# Patient Record
Sex: FEMALE | Race: WHITE
[De-identification: names, ages, dates, MRNs, and addresses within clinical notes are randomized per-mention and may not be internally consistent; named-entity substitution may affect disease eponyms.]

---

## 2020-11-04 ENCOUNTER — HOSPITAL ENCOUNTER (EMERGENCY)
Dept: HOSPITAL 41 - JD.ED | Age: 30
Discharge: HOME | End: 2020-11-04
Payer: COMMERCIAL

## 2020-11-04 DIAGNOSIS — E11.9: ICD-10-CM

## 2020-11-04 DIAGNOSIS — U07.1: Primary | ICD-10-CM

## 2020-11-04 PROCEDURE — 85025 COMPLETE CBC W/AUTO DIFF WBC: CPT

## 2020-11-04 PROCEDURE — 83605 ASSAY OF LACTIC ACID: CPT

## 2020-11-04 PROCEDURE — 99285 EMERGENCY DEPT VISIT HI MDM: CPT

## 2020-11-04 PROCEDURE — 93005 ELECTROCARDIOGRAM TRACING: CPT

## 2020-11-04 PROCEDURE — 83615 LACTATE (LD) (LDH) ENZYME: CPT

## 2020-11-04 PROCEDURE — 36415 COLL VENOUS BLD VENIPUNCTURE: CPT

## 2020-11-04 PROCEDURE — 82728 ASSAY OF FERRITIN: CPT

## 2020-11-04 PROCEDURE — 84484 ASSAY OF TROPONIN QUANT: CPT

## 2020-11-04 PROCEDURE — 80053 COMPREHEN METABOLIC PANEL: CPT

## 2020-11-04 PROCEDURE — 86140 C-REACTIVE PROTEIN: CPT

## 2020-11-04 PROCEDURE — U0002 COVID-19 LAB TEST NON-CDC: HCPCS

## 2020-11-04 PROCEDURE — 87635 SARS-COV-2 COVID-19 AMP PRB: CPT

## 2020-11-04 PROCEDURE — 71045 X-RAY EXAM CHEST 1 VIEW: CPT

## 2020-11-04 PROCEDURE — 85379 FIBRIN DEGRADATION QUANT: CPT

## 2020-11-04 NOTE — EDM.PDOC
ED HPI GENERAL MEDICAL PROBLEM





- General


Chief Complaint: Respiratory Problem


Stated Complaint: SOB/COUGH


Time Seen by Provider: 11/04/20 16:24


Source of Information: Reports: Patient, RN Notes Reviewed


History Limitations: Reports: No Limitations





- History of Present Illness


INITIAL COMMENTS - FREE TEXT/NARRATIVE: 





Patient is a 30-year-old female presenting to the emergency department with 

complaints of cough, shortness of breath, body aches, and fever.  States her 

symptoms began on Thursday and have been progressively worsening.  She has had a

known exposure to COVID-19 as her boyfriend who lives with her also has it.  She

contacted her primary care provider in [place] to discuss her symptoms on 

Monday.  She was started on albuterol and Advair inhalers, although she does not

have a history of asthma.  She does have a history of Hodgkin's lymphoma 

requiring chemo and radiation back in 2007.  She is currently in remission from 

this.  She states she did have several rounds of radiation to her mid sternum 

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 

the radiation.  She states whenever she gets sick she generally gets short of 

breath.  She denies any abdominal pain, nausea, or vomiting.  She last used her 

albuterol this morning.  She states it does seem to improve her shortness of 

breath.  Last dose of Tylenol was around 10:00 this morning.





- Related Data


                                    Allergies











Allergy/AdvReac Type Severity Reaction Status Date / Time


 


No Known Allergies Allergy   Verified 11/04/20 15:28











Home Meds: 


                                    Home Meds





Albuterol Sulfate [Albuterol Sulfate Hfa]  11/04/20 [History]


Fluticasone Propion/Salmeterol [Advair Hfa 45-21 Mcg Inhaler]  11/04/20 

[History]


dexAMETHasone [Dexamethasone] 6 mg PO BID 5 Days #10 tab 11/04/20 [Rx]











Past Medical History


Endocrine/Metabolic History: Reports: Diabetes, Type II


Oncologic (Cancer) History: Reports: Hodgkin's Lymphoma





- Infectious Disease History


Infectious Disease History: Reports: Novel Coronavirus





Social & Family History





- Tobacco Use


Tobacco Use Status *Q: Never Tobacco User





ED ROS GENERAL





- Review of Systems


Review Of Systems: See Below


Constitutional: Reports: Fever, Chills, Fatigue


HEENT: Reports: No Symptoms


Respiratory: Reports: Shortness of Breath, Pleuritic Chest Pain, Cough


Cardiovascular: Reports: Dyspnea on Exertion.  Denies: Lightheadedness, Syncope


Endocrine: Reports: No Symptoms


GI/Abdominal: Reports: No Symptoms


: Reports: No Symptoms


Musculoskeletal: Reports: Other (Generalized body aches)


Skin: Reports: No Symptoms


Neurological: Reports: No Symptoms


Psychiatric: Reports: No Symptoms


Hematologic/Lymphatic: Reports: No Symptoms


Immunologic: Reports: No Symptoms





ED EXAM, GENERAL





- Physical Exam


Exam: See Below


General Appearance: Alert, WD/WN, No Apparent Distress


Respiratory/Chest: No Respiratory Distress, Lungs Clear, Normal Breath Sounds, 

No Accessory Muscle Use, Chest Non-Tender, Other (Tachypneic with shallow 

respirations)


Cardiovascular: Normal Peripheral Pulses, Regular Rate, Rhythm, No Edema, No 

Gallop, No JVD, No Murmur, No Rub


GI/Abdominal: Normal Bowel Sounds, Soft, Non-Tender, No Organomegaly, No 

Distention, No Abnormal Bruit, No Mass


Neurological: Alert, Oriented, CN II-XII Intact, Normal Cognition, Normal Gait, 

Normal Reflexes, No Motor/Sensory Deficits


Psychiatric: Normal Affect, Normal Mood


Skin Exam: Warm, Dry, Intact, Normal Color, No Rash


  ** #1 Interpretation


EKG Date: 11/04/20


Time: 16:52


Rhythm: NSR


Rate (Beats/Min): 134


Axis: Normal


P-Wave: Present


QRS: Normal


ST-T: Normal


QT: Normal


EKG Interpretation Comments: 





 Sinus tachycardia at 134


Decreased voltage precordial leads


Occasional PACs


Consider left atrial hypertrophy


EKG interpreted by Dr. Nohemi JENNINGS





Course





- Vital Signs


Last Recorded V/S: 


                                Last Vital Signs











Temp  99.9 F   11/04/20 15:28


 


Pulse  130 H  11/04/20 15:28


 


Resp  20   11/04/20 16:48


 


BP  153/98 H  11/04/20 15:28


 


Pulse Ox  94 L  11/04/20 16:48








                                        





Orthostatic Blood Pressure [     118/90


Standing]                        


Orthostatic Blood Pressure [     115/80


Supine]                          











- Orders/Labs/Meds


Orders: 


                               Active Orders 24 hr











 Category Date Time Status


 


 Chest 1V Frontal [CR] Stat Exams  11/04/20 16:25 Taken


 


 Isolation [COMM] Routine Oth  11/04/20 16:06 Ordered


 


 Peripheral IV Insertion Adult [OM.PC] Stat Oth  11/04/20 16:25 Ordered











Labs: 


                                Laboratory Tests











  11/04/20 11/04/20 11/04/20 Range/Units





  16:45 16:50 16:50 


 


WBC     (3.98-10.04)  K/mm3


 


RBC     (3.98-5.22)  M/mm3


 


Hgb     (11.2-15.7)  gm/dl


 


Hct     (34.1-44.9)  %


 


MCV     (79.4-94.8)  fl


 


MCH     (25.6-32.2)  pg


 


MCHC     (32.2-35.5)  g/dl


 


RDW Std Deviation     (36.4-46.3)  fL


 


Plt Count     (182-369)  K/mm3


 


MPV     (9.4-12.3)  fl


 


Neut % (Auto)     (34.0-71.1)  %


 


Lymph % (Auto)     (19.3-51.7)  %


 


Mono % (Auto)     (4.7-12.5)  %


 


Eos % (Auto)     (0.7-5.8)  


 


Baso % (Auto)     (0.1-1.2)  %


 


Neut # (Auto)     (1.56-6.13)  K/mm3


 


Lymph # (Auto)     (1.18-3.74)  K/mm3


 


Mono # (Auto)     (0.24-0.36)  K/mm3


 


Eos # (Auto)     (0.04-0.36)  K/mm3


 


Baso # (Auto)     (0.01-0.08)  K/mm3


 


Manual Slide Review     


 


D-Dimer, Quantitative     (0.19-0.50)  mg/L


 


Sodium     (136-145)  mEq/L


 


Potassium     (3.5-5.1)  mEq/L


 


Chloride     ()  mEq/L


 


Carbon Dioxide     (21-32)  mEq/L


 


Anion Gap     (5-15)  


 


BUN     (7-18)  mg/dL


 


Creatinine     (0.55-1.02)  mg/dL


 


Est Cr Clr Drug Dosing     mL/min


 


Estimated GFR (MDRD)     (>60)  mL/min


 


BUN/Creatinine Ratio     (14-18)  


 


Glucose     ()  mg/dL


 


Lactic Acid     (0.4-2.0)  mmol/L


 


Calcium     (8.5-10.1)  mg/dL


 


Ferritin    92  (8-252)  ng/ml


 


Total Bilirubin     (0.2-1.0)  mg/dL


 


AST     (15-37)  U/L


 


ALT     (14-59)  U/L


 


Alkaline Phosphatase     ()  U/L


 


Lactate Dehydrogenase   228   ()  U/L


 


Troponin I     (0.00-0.056)  ng/mL


 


C-Reactive Protein     (<1.0)  mg/dL


 


Total Protein     (6.4-8.2)  g/dl


 


Albumin     (3.4-5.0)  g/dl


 


Globulin     gm/dL


 


Albumin/Globulin Ratio     (1-2)  


 


SARS-CoV-2 RNA (JANEY)  Positive H    (NEGATIVE)  














  11/04/20 11/04/20 11/04/20 Range/Units





  16:50 16:50 16:50 


 


WBC  7.61    (3.98-10.04)  K/mm3


 


RBC  5.05    (3.98-5.22)  M/mm3


 


Hgb  14.5    (11.2-15.7)  gm/dl


 


Hct  45.5 H    (34.1-44.9)  %


 


MCV  90.1    (79.4-94.8)  fl


 


MCH  28.7    (25.6-32.2)  pg


 


MCHC  31.9 L    (32.2-35.5)  g/dl


 


RDW Std Deviation  46.1    (36.4-46.3)  fL


 


Plt Count  318    (182-369)  K/mm3


 


MPV  10.1    (9.4-12.3)  fl


 


Neut % (Auto)  62.7    (34.0-71.1)  %


 


Lymph % (Auto)  24.3    (19.3-51.7)  %


 


Mono % (Auto)  12.1    (4.7-12.5)  %


 


Eos % (Auto)  0.3 L    (0.7-5.8)  


 


Baso % (Auto)  0.3    (0.1-1.2)  %


 


Neut # (Auto)  4.78    (1.56-6.13)  K/mm3


 


Lymph # (Auto)  1.85    (1.18-3.74)  K/mm3


 


Mono # (Auto)  0.92 H    (0.24-0.36)  K/mm3


 


Eos # (Auto)  0.02 L    (0.04-0.36)  K/mm3


 


Baso # (Auto)  0.02    (0.01-0.08)  K/mm3


 


Manual Slide Review  Normal smear    


 


D-Dimer, Quantitative   0.22   (0.19-0.50)  mg/L


 


Sodium    135 L  (136-145)  mEq/L


 


Potassium    3.6  (3.5-5.1)  mEq/L


 


Chloride    98  ()  mEq/L


 


Carbon Dioxide    26  (21-32)  mEq/L


 


Anion Gap    14.6  (5-15)  


 


BUN    6 L  (7-18)  mg/dL


 


Creatinine    0.8  (0.55-1.02)  mg/dL


 


Est Cr Clr Drug Dosing    99.99  mL/min


 


Estimated GFR (MDRD)    > 60  (>60)  mL/min


 


BUN/Creatinine Ratio    7.5 L  (14-18)  


 


Glucose    266 H  ()  mg/dL


 


Lactic Acid     (0.4-2.0)  mmol/L


 


Calcium    9.2  (8.5-10.1)  mg/dL


 


Ferritin     (8-252)  ng/ml


 


Total Bilirubin    0.3  (0.2-1.0)  mg/dL


 


AST    57 H  (15-37)  U/L


 


ALT    69 H  (14-59)  U/L


 


Alkaline Phosphatase    77  ()  U/L


 


Lactate Dehydrogenase     ()  U/L


 


Troponin I    < 0.017  (0.00-0.056)  ng/mL


 


C-Reactive Protein    6.5 H*  (<1.0)  mg/dL


 


Total Protein    8.1  (6.4-8.2)  g/dl


 


Albumin    3.2 L  (3.4-5.0)  g/dl


 


Globulin    4.9  gm/dL


 


Albumin/Globulin Ratio    0.7 L  (1-2)  


 


SARS-CoV-2 RNA (JANEY)     (NEGATIVE)  














  11/04/20 11/04/20 Range/Units





  16:50 20:10 


 


WBC    (3.98-10.04)  K/mm3


 


RBC    (3.98-5.22)  M/mm3


 


Hgb    (11.2-15.7)  gm/dl


 


Hct    (34.1-44.9)  %


 


MCV    (79.4-94.8)  fl


 


MCH    (25.6-32.2)  pg


 


MCHC    (32.2-35.5)  g/dl


 


RDW Std Deviation    (36.4-46.3)  fL


 


Plt Count    (182-369)  K/mm3


 


MPV    (9.4-12.3)  fl


 


Neut % (Auto)    (34.0-71.1)  %


 


Lymph % (Auto)    (19.3-51.7)  %


 


Mono % (Auto)    (4.7-12.5)  %


 


Eos % (Auto)    (0.7-5.8)  


 


Baso % (Auto)    (0.1-1.2)  %


 


Neut # (Auto)    (1.56-6.13)  K/mm3


 


Lymph # (Auto)    (1.18-3.74)  K/mm3


 


Mono # (Auto)    (0.24-0.36)  K/mm3


 


Eos # (Auto)    (0.04-0.36)  K/mm3


 


Baso # (Auto)    (0.01-0.08)  K/mm3


 


Manual Slide Review    


 


D-Dimer, Quantitative    (0.19-0.50)  mg/L


 


Sodium    (136-145)  mEq/L


 


Potassium    (3.5-5.1)  mEq/L


 


Chloride    ()  mEq/L


 


Carbon Dioxide    (21-32)  mEq/L


 


Anion Gap    (5-15)  


 


BUN    (7-18)  mg/dL


 


Creatinine    (0.55-1.02)  mg/dL


 


Est Cr Clr Drug Dosing    mL/min


 


Estimated GFR (MDRD)    (>60)  mL/min


 


BUN/Creatinine Ratio    (14-18)  


 


Glucose    ()  mg/dL


 


Lactic Acid  2.9 H*  1.7  (0.4-2.0)  mmol/L


 


Calcium    (8.5-10.1)  mg/dL


 


Ferritin    (8-252)  ng/ml


 


Total Bilirubin    (0.2-1.0)  mg/dL


 


AST    (15-37)  U/L


 


ALT    (14-59)  U/L


 


Alkaline Phosphatase    ()  U/L


 


Lactate Dehydrogenase    ()  U/L


 


Troponin I    (0.00-0.056)  ng/mL


 


C-Reactive Protein    (<1.0)  mg/dL


 


Total Protein    (6.4-8.2)  g/dl


 


Albumin    (3.4-5.0)  g/dl


 


Globulin    gm/dL


 


Albumin/Globulin Ratio    (1-2)  


 


SARS-CoV-2 RNA (JANEY)    (NEGATIVE)  











Meds: 


Medications














Discontinued Medications














Generic Name Dose Route Start Last Admin





  Trade Name Freq  PRN Reason Stop Dose Admin


 


Acetaminophen  975 mg  11/04/20 17:30  11/04/20 17:48





  Tylenol  PO  11/04/20 17:31  975 mg





  NOW ONE   Administration


 


Dexamethasone  6 mg  11/04/20 17:29  11/04/20 17:47





  Dexamethasone  PO  11/04/20 17:30  6 mg





  ONETIME ONE   Administration


 


Sodium Chloride  1,000 mls @ 999 mls/hr  11/04/20 17:56  11/04/20 18:08





  Normal Saline  IV  11/04/20 18:56  999 mls/hr





  NOW STA   Administration


 


Sodium Chloride  10 ml  11/04/20 16:24  11/04/20 17:03





  Saline Flush  FLUSH   10 ml





  ASDIRECTED PRN   Administration





  Keep Vein Open  














- Re-Assessments/Exams


Free Text/Narrative Re-Assessment/Exam: 


Patient is a 30-year-old female presenting to the emergency department with 

complaints of cough, shortness of breath, fever, body aches since Thursday of 

last week.  She is had known exposure to COVID-19.  She has been using albuterol

 and Advair inhaler since Monday.  States this does help, but shortly thereafter

 she feels short of breath again.  She does not have a home pulse oximeter so 

she is not sure what her oxygen saturations have been running.  Initial oxygen 

saturation was 88% on room air during triage, however it did improve to 91 to 

94% on room air with rest.  She is tachycardic in the 130s with a low-grade 

fever.  Patient is quite certain that she does not want to be transferred to 

another facility for admission as we do not have beds here in [place].  We 

will do complete a work-up on her and further discuss this once results are 

available.  I have ordered CBC, CMP, CRP, lactic acid, D-dimer, troponin, EKG, 

chest x-ray, and a 1 hour Covid test.  We will give her dexamethasone 6 mg p.o. 

as well as Tylenol 975 mg as it is very likely that the patient has Covid based 

on her symptoms and history of exposure.  Patient feels that she is dehydrated 

as she states she has lost 10 pounds since the onset of this illness.  We will 

wait for the labs to return and complete orthostatic vital signs higher to fluid

 administration.





11/04/20 19:39


Hematology was significant for glucose elevated at 266, lactic acid 2.9, CRP 

6.5.  D-dimer and troponin were normal.  Patient was found to be orthostatic.  

Heart rate increased from 128 - 148 from lying to standing.  She is Covid 

positive as expected.  Chest x-ray shows low lung volumes with minimal patchy 

basilar opacities which may represent atelectasis or pneumonia.  I have given 

her her liter of IV fluids as this could explain her lactic acid elevation as 

well.  She is on the edge of requiring admission as her oxygen saturations are 

in the low 90s and occasionally dipped into the upper 80s, however she continues

 to be adamant that she does not want to be admitted since we have no beds in 

[place] and she does not want to be transferred to an outlying facility.  She 

is feeling much better after the liter of IV fluids and dexamethasone pulse.  

States she does not feel nearly as short of breath.  We will plan to repeat a 

lactic acid at 3 hours.  I have sent in electronic prescription for 

dexamethasone to anali Ramírez for her mother to .  Her mother will

 also  a home pulse oximeter so that she may monitor her oxygen 

saturations at home since patient does not want to be admitted at this time.


11/04/20 21:10


Patient's repeat lactic acid was 1.7.  She continues to feel better after the 

dexamethasone.  She also used her albuterol inhaler again.  Oxygen saturations 

have maintained in the low 90s.  Her mother was able to get her a pulse oximeter

 as well as  her dexamethasone.  We will discharge her home with strict 

return precautions.  She verbalized understanding of instructions.  Discharge 

instructions as documented.





Departure





- Departure


Time of Disposition: 21:11


Disposition: Home, Self-Care 01


Condition: Good


Clinical Impression: 


 COVID-19








- Discharge Information


*PRESCRIPTION DRUG MONITORING PROGRAM REVIEWED*: No


*COPY OF PRESCRIPTION DRUG MONITORING REPORT IN PATIENT SAVI: No


Prescriptions: 


dexAMETHasone [Dexamethasone] 6 mg PO BID 5 Days #10 tab


Instructions:  COVID-19 Frequently Asked Questions, COVID-19


Referrals: 


Shirley Castellano NP [Primary Care Provider] - 


Forms:  ED Department Discharge


Additional Instructions: 


You were seen in the emergency department today for worsening of shortness of 

breath with a known exposure to COVID-19.  Your work-up included blood work, an 

EKG of your heart, chest x-ray, and a Covid test.  Results of your work-up 

showed that you were mildly dehydrated.  Chest x-ray showed some possible viral 

pneumonia versus atelectasis.  Your blood work was otherwise normal.  The EKG of

your heart was normal.  Your Covid test was positive.  While in the emergency 

department you received a first dose of dexamethasone which is a steroid, T

ylenol, as well as a liter of IV fluids.  You stated that this did improve your 

shortness of breath.  Your oxygen saturations were on the low end of normal in 

the emergency department.  A prescription for dexamethasone has been sent.  Take

this medication as prescribed.  Continue to use your albuterol inhaler as needed

for shortness of breath.  Recommend that you monitor your oxygen saturations in

termittently throughout the day.  If you find that you are maintaining an oxygen

saturation below 90% or develop any new or worsening symptoms of concern, you 

should return to the emergency department for reevaluation.





Sepsis Event Note (ED)





- Evaluation


Sepsis Screening Result: Possible Sepsis Risk





- Focused Exam


Vital Signs: 


                                   Vital Signs











  Temp Pulse Resp BP Pulse Ox


 


 11/04/20 16:48    20   94 L


 


 11/04/20 15:28  99.9 F  130 H  24 H  153/98 H  88 L














- My Orders


Last 24 Hours: 


My Active Orders





11/04/20 16:25


Chest 1V Frontal [CR] Stat 


Peripheral IV Insertion Adult [OM.PC] Stat 














- Assessment/Plan


Last 24 Hours: 


My Active Orders





11/04/20 16:25


Chest 1V Frontal [CR] Stat 


Peripheral IV Insertion Adult [OM.PC] Stat

## 2020-11-05 NOTE — CR
PROCEDURE INFORMATION:

Exam: XR Chest, 1 View

Exam date and time: 11/4/2020 4:25 PM

Age: 30 years old

Clinical indication: Cough and shortness of breath and other: Covid?

TECHNIQUE:

Imaging protocol: XR of the chest

Views: 1 view.

COMPARISON:

No relevant prior studies available.

FINDINGS:

Lungs: Lung volumes are low. Minimal patchy bilateral lower lobe opacities.

Pleural space: Unremarkable. No pleural effusion. No pneumothorax.

Heart/Mediastinum: Unremarkable. No cardiomegaly.

Bones/joints: Unremarkable.

IMPRESSION:

Low lung volumes with minimal patchy basilar opacities which may represent 
atelectasis and or

pneumonia.



Thank you for allowing us to participate in the care of your patient.

Dictated and Authenticated by: Brando Dempsey MD

11/04/2020 6:03 PM Central Time ([place] & [place])

[facility]

## 2020-11-09 ENCOUNTER — HOSPITAL ENCOUNTER (EMERGENCY)
Dept: HOSPITAL 41 - JD.ED | Age: 30
Discharge: HOME | End: 2020-11-09
Payer: COMMERCIAL

## 2020-11-09 DIAGNOSIS — E11.9: ICD-10-CM

## 2020-11-09 DIAGNOSIS — U07.1: Primary | ICD-10-CM

## 2020-11-09 DIAGNOSIS — Z79.899: ICD-10-CM

## 2020-11-09 NOTE — EDM.PDOC
ED HPI GENERAL MEDICAL PROBLEM





- General


Chief Complaint: Respiratory Problem


Stated Complaint: LOW O2


Time Seen by Provider: 11/09/20 13:20


Source of Information: Reports: Patient, Old Records, RN Notes Reviewed


History Limitations: Reports: No Limitations





- History of Present Illness


INITIAL COMMENTS - FREE TEXT/NARRATIVE: 





The patient is a 30-year-old female who presents to the ED for her ongoing 

COVID-19 symptoms.  The patient was diagnosed with COVID-19 on 11/4/2020, but 

she states that she had been symptomatic, the Friday before that which would 

have been 10/30/2020.  She notes that everything is been going well at home, she

is taking her steroids as prescribed.  But she noted today that she could not 

get her O2 saturations above 90% at home.  She noted they have been around 85 to

88%.  She states that any sort of movement really place her out, and she is not 

able to tolerate activity much at all.  She has a history of Hodgkin's lymphoma 

with radiation to her chest and spleen in 2007, and states that everything on 

that front is okay.  Her primary care provider did put her on an Advair inhaler 

and albuterol inhaler and those seem to help as well.  She states that today is 

her last day of steroids, so she became concerned when her oxygen levels were 

not much better.  She is living with her mother and father at this time, and she

states they are helping her quite a bit through this illness.  She is not 

noticed any fevers or chills, but states she is taking aspirin fairly regularly.

 She is not complaining of any nausea or vomiting, she is having a cough and 

states this is getting to be somewhat productive, and is complaining of 

worsening shortness of breath.





- Related Data


                                    Allergies











Allergy/AdvReac Type Severity Reaction Status Date / Time


 


No Known Allergies Allergy   Verified 11/09/20 13:12











Home Meds: 


                                    Home Meds





Albuterol Sulfate [Albuterol Sulfate Hfa] 2 puff INH BID 11/04/20 [History]


Fluticasone Propion/Salmeterol [Advair Hfa 45-21 Mcg Inhaler] 2 puff INH DAILY 

11/04/20 [History]


dexAMETHasone [Dexamethasone] 6 mg PO BID 5 Days #10 tab 11/09/20 [Rx]


norgestimate-ethinyl estradioL [[place] 28 Tablet] 1 tab PO ASDIRECTED 

11/09/20 [History]











Past Medical History


Endocrine/Metabolic History: Reports: Diabetes, Type II


Oncologic (Cancer) History: Reports: Hodgkin's Lymphoma


Other Oncologic History: 2007





- Infectious Disease History


Infectious Disease History: Reports: Novel Coronavirus (11/4/2020)





Social & Family History





- Tobacco Use


Tobacco Use Status *Q: Never Tobacco User





ED ROS GENERAL





- Review of Systems


Review Of Systems: Comprehensive ROS is negative, except as noted in HPI.





ED EXAM, GENERAL





- Physical Exam


Exam: See Below


Exam Limited By: No Limitations


General Appearance: Alert, WD/WN, Mild Distress (pt seems mildlyl breathless; 

has a hard time completing sentences in entirety.)


Respiratory/Chest: Lungs Clear, Respiratory Distress (mild), Decreased Breath 

Sounds (diffuse bilaterally)


Cardiovascular: Normal Peripheral Pulses, Regular Rate, Rhythm, No Murmur


Extremities: Normal Inspection, Normal Capillary Refill


Neurological: Alert, Oriented, Normal Cognition, No Motor/Sensory Deficits


Psychiatric: Normal Affect, Normal Mood


Skin Exam: Warm, Dry, Intact, Normal Color, No Rash





Course





- Vital Signs


Last Recorded V/S: 


                                Last Vital Signs











Temp  98.2 F   11/09/20 13:13


 


Pulse  84   11/09/20 15:50


 


Resp  18   11/09/20 15:50


 


BP  140/98 H  11/09/20 15:50


 


Pulse Ox  90 L  11/09/20 15:50














- Orders/Labs/Meds


Orders: 


                               Active Orders 24 hr











 Category Date Time Status


 


 Sodium Chloride 0.9% [Saline Flush] Med  11/09/20 13:41 Active





 10 ml FLUSH ASDIRECTED PRN   


 


 Isolation [COMM] Routine Oth  11/09/20 13:24 Ordered


 


 Peripheral IV Insertion Adult [OM.PC] Routine Oth  11/09/20 13:41 Ordered








                                Medication Orders





Sodium Chloride (Saline Flush)  10 ml FLUSH ASDIRECTED PRN


   PRN Reason: Keep Vein Open








Labs: 


                                Laboratory Tests











  11/09/20 11/09/20 11/09/20 Range/Units





  14:18 14:18 14:18 


 


WBC   8.44   (3.98-10.04)  K/mm3


 


RBC   4.87   (3.98-5.22)  M/mm3


 


Hgb   13.8   (11.2-15.7)  gm/dl


 


Hct   42.3   (34.1-44.9)  %


 


MCV   86.9  D   (79.4-94.8)  fl


 


MCH   28.3   (25.6-32.2)  pg


 


MCHC   32.6   (32.2-35.5)  g/dl


 


RDW Std Deviation   42.5   (36.4-46.3)  fL


 


Plt Count   539 H D   (182-369)  K/mm3


 


MPV   10.0   (9.4-12.3)  fl


 


Neutrophils % (Manual)   71 H   (40-60)  %


 


Band Neutrophils %   0   (0-10)  %


 


Lymphocytes % (Manual)   21   (20-40)  %


 


Atypical Lymphs %   0   %


 


Monocytes % (Manual)   8   (2-10)  %


 


Eosinophils % (Manual)   0 L   (0.7-5.8)  %


 


Basophils % (Manual)   0 L   (0.1-1.2)  


 


Platelet Estimate   Increased   


 


RBC Morph Comment   Normal   


 


D-Dimer, Quantitative    < 0.19 L  (0.19-0.50)  mg/L


 


Sodium     (136-145)  mEq/L


 


Potassium     (3.5-5.1)  mEq/L


 


Chloride     ()  mEq/L


 


Carbon Dioxide     (21-32)  mEq/L


 


Anion Gap     (5-15)  


 


BUN     (7-18)  mg/dL


 


Creatinine     (0.55-1.02)  mg/dL


 


Est Cr Clr Drug Dosing     mL/min


 


Estimated GFR (MDRD)     (>60)  mL/min


 


BUN/Creatinine Ratio     (14-18)  


 


Glucose     ()  mg/dL


 


Calcium     (8.5-10.1)  mg/dL


 


Magnesium     (1.8-2.4)  mg/dl


 


Total Bilirubin     (0.2-1.0)  mg/dL


 


AST     (15-37)  U/L


 


ALT     (14-59)  U/L


 


Alkaline Phosphatase     ()  U/L


 


C-Reactive Protein  1.2 H*    (<1.0)  mg/dL


 


Total Protein     (6.4-8.2)  g/dl


 


Albumin     (3.4-5.0)  g/dl


 


Globulin     gm/dL


 


Albumin/Globulin Ratio     (1-2)  














  11/09/20 Range/Units





  14:18 


 


WBC   (3.98-10.04)  K/mm3


 


RBC   (3.98-5.22)  M/mm3


 


Hgb   (11.2-15.7)  gm/dl


 


Hct   (34.1-44.9)  %


 


MCV   (79.4-94.8)  fl


 


MCH   (25.6-32.2)  pg


 


MCHC   (32.2-35.5)  g/dl


 


RDW Std Deviation   (36.4-46.3)  fL


 


Plt Count   (182-369)  K/mm3


 


MPV   (9.4-12.3)  fl


 


Neutrophils % (Manual)   (40-60)  %


 


Band Neutrophils %   (0-10)  %


 


Lymphocytes % (Manual)   (20-40)  %


 


Atypical Lymphs %   %


 


Monocytes % (Manual)   (2-10)  %


 


Eosinophils % (Manual)   (0.7-5.8)  %


 


Basophils % (Manual)   (0.1-1.2)  


 


Platelet Estimate   


 


RBC Morph Comment   


 


D-Dimer, Quantitative   (0.19-0.50)  mg/L


 


Sodium  131 L  (136-145)  mEq/L


 


Potassium  4.2  (3.5-5.1)  mEq/L


 


Chloride  95 L  ()  mEq/L


 


Carbon Dioxide  21  (21-32)  mEq/L


 


Anion Gap  19.2 H  (5-15)  


 


BUN  17  (7-18)  mg/dL


 


Creatinine  0.8  (0.55-1.02)  mg/dL


 


Est Cr Clr Drug Dosing  99.99  mL/min


 


Estimated GFR (MDRD)  > 60  (>60)  mL/min


 


BUN/Creatinine Ratio  21.3 H  (14-18)  


 


Glucose  460 H  ()  mg/dL


 


Calcium  9.1  (8.5-10.1)  mg/dL


 


Magnesium  2.1  (1.8-2.4)  mg/dl


 


Total Bilirubin  0.3  (0.2-1.0)  mg/dL


 


AST  19  (15-37)  U/L


 


ALT  34  (14-59)  U/L


 


Alkaline Phosphatase  64  ()  U/L


 


C-Reactive Protein   (<1.0)  mg/dL


 


Total Protein  7.3  (6.4-8.2)  g/dl


 


Albumin  2.9 L  (3.4-5.0)  g/dl


 


Globulin  4.4  gm/dL


 


Albumin/Globulin Ratio  0.7 L  (1-2)  











Meds: 


Medications











Generic Name Dose Route Start Last Admin





  Trade Name Freq  PRN Reason Stop Dose Admin


 


Sodium Chloride  10 ml  11/09/20 13:41 





  Saline Flush  FLUSH  





  ASDIRECTED PRN  





  Keep Vein Open  














- Re-Assessments/Exams


Free Text/Narrative Re-Assessment/Exam: 





11/09/20 13:46


Patient presents to the ED for her ongoing COVID symptoms. We will repeat chest 

x-ray and a few labs. The patient may qualify for home 02 services, as she seems

 quite capable to monitor her symptoms on her own.





11/09/20 14:18


Patient's chest x-ray has been read and demonstrates no change from 11/4/2020.  

There is some atelectasis or infiltrate in the lung bases that were appreciated.





11/09/20 15:18


Patient's laboratory evaluation has been completed, and there are no acute focal

 abnormalities at this time.  I [facility] Rad[facility] at  [facility] for possible home oxygen set up.  We did ambulate the patient in the 

room, and her O2 sats did drop to 87% on room air. 





11/09/20 15:25


Patient was placed on oxygen via nasal cannula at 2 L, and she did seem to 

recover well, O2 sats at rest are about 95-96%. Her O2 sats with ambulation on 

2LNC were 94%.








Departure





- Departure


Time of Disposition: 15:20


Disposition: Home, Self-Care 01


Condition: Good


Clinical Impression: 


 COVID-19, Hypoxia








- Discharge Information


*PRESCRIPTION DRUG MONITORING PROGRAM REVIEWED*: No


*COPY OF PRESCRIPTION DRUG MONITORING REPORT IN PATIENT SAVI: No


Prescriptions: 


dexAMETHasone [Dexamethasone] 6 mg PO BID 5 Days #10 tab


Instructions:  COVID-19 Frequently Asked Questions


Referrals: 


PCP,Not In Area [Primary Care Provider] - 


Forms:  ED Department Discharge


Additional Instructions: 


You were evaluated in the ER today for your ongoing COVID-19 symptoms.





Your lab work and chest x-ray at today's visit, are not worsening in any way, to

make us think that you need hospitalization at this time. Your blood glucose was

high at today's visit. If you do not already have a blood glucose monitor at 

home to monitor your blood sugars. As you are on steroids, your blood sugars are

likely going to be somewhat uncontrolled while taking the steroids. 





However your oxygen saturations while you are ambulating of the room did drop to

87% on room air, which will qualify you for home oxygen at this time.  An order 

has been placed to [facility] for set up.  You will need to go 

there after your discharge, to obtain the services.  There address is  [address] [place] [address] [id number], their telephone number is [phone].





As above, you have been given a prescription for oxygen at home use, I would 

recommend using 2 L via nasal cannula, to keep your O2 saturations above 90%  

You may only increase the liter flow to 4 L, if this does not keep your oxygen 

above 90%, then you should return immediately to the ER for further management.





Please continue all other medications as previously prescribed by your regular 

provider for further management of your COVID-19 symptoms.  Please go home, and 

continue to increase your oral fluid hydration, as you were slightly dehydrated 

while you were in the ER.  You did not receive any sort of IV fluids.  Liquids 

like Gatorade/Powerade would be sufficient, and please try to remember to eat 

several small meals throughout the day, to help keep her nutrition up while you 

are sick.





Please return to the ER at any time if symptoms change or worsen.











Sepsis Event Note (ED)





- Evaluation


Sepsis Screening Result: No Definite Risk





- Focused Exam


Vital Signs: 


                                   Vital Signs











  Temp Pulse Resp BP Pulse Ox


 


 11/09/20 15:50   84  18  140/98 H  90 L


 


 11/09/20 13:13  98.2 F  106 H  22 H  134/99 H  94 L














- My Orders


Last 24 Hours: 


My Active Orders





11/09/20 13:24


Isolation [COMM] Routine 





11/09/20 13:41


Sodium Chloride 0.9% [Saline Flush]   10 ml FLUSH ASDIRECTED PRN 


Peripheral IV Insertion Adult [OM.PC] Routine 














- Assessment/Plan


Last 24 Hours: 


My Active Orders





11/09/20 13:24


Isolation [COMM] Routine 





11/09/20 13:41


Sodium Chloride 0.9% [Saline Flush]   10 ml FLUSH ASDIRECTED PRN 


Peripheral IV Insertion Adult [OM.PC] Routine

## 2020-11-09 NOTE — CR
PROCEDURE INFORMATION:

Exam: XR Chest, 1 View

Exam date and time: 11/9/2020 1:37 PM

Age: 30 years old

Clinical indication: Shortness of breath; Patient HX: Covid+, increasing SOB



TECHNIQUE:

Imaging protocol: XR of the chest

Views: 1 view.



COMPARISON:

CR Chest 1V Frontal 11/4/2020 4:25 PM



FINDINGS:

Lungs: Stable atelectasis or infiltrate in the lung bases. Calcified right hilar
lymph nodes.

Pleural space: Unremarkable. No pleural effusion. No pneumothorax.

Heart/Mediastinum: Unremarkable. No cardiomegaly.

Bones/joints: Unremarkable.

Other findings: Shallow inspiration.



IMPRESSION: No change since 11/04/2020. Atelectasis or infiltrate in the lung 
bases.



Thank you for allowing us to participate in the care of your patient.



Dictated and Authenticated by: Kami Summers MD

11/09/2020 3:15 PM Central Time ([place] & [place])

SHERRI

## 2022-01-02 ENCOUNTER — HOSPITAL ENCOUNTER (EMERGENCY)
Dept: HOSPITAL 41 - JD.ED | Age: 32
Discharge: HOME | End: 2022-01-02
Payer: COMMERCIAL

## 2022-01-02 DIAGNOSIS — E66.9: ICD-10-CM

## 2022-01-02 DIAGNOSIS — E11.9: ICD-10-CM

## 2022-01-02 DIAGNOSIS — N13.2: Primary | ICD-10-CM

## 2022-01-02 PROCEDURE — 99284 EMERGENCY DEPT VISIT MOD MDM: CPT

## 2022-01-02 PROCEDURE — 96375 TX/PRO/DX INJ NEW DRUG ADDON: CPT

## 2022-01-02 PROCEDURE — 81001 URINALYSIS AUTO W/SCOPE: CPT

## 2022-01-02 PROCEDURE — 74177 CT ABD & PELVIS W/CONTRAST: CPT

## 2022-01-02 PROCEDURE — 36415 COLL VENOUS BLD VENIPUNCTURE: CPT

## 2022-01-02 PROCEDURE — 81025 URINE PREGNANCY TEST: CPT

## 2022-01-02 PROCEDURE — 85025 COMPLETE CBC W/AUTO DIFF WBC: CPT

## 2022-01-02 PROCEDURE — 96376 TX/PRO/DX INJ SAME DRUG ADON: CPT

## 2022-01-02 PROCEDURE — 96374 THER/PROPH/DIAG INJ IV PUSH: CPT

## 2022-01-02 PROCEDURE — 80048 BASIC METABOLIC PNL TOTAL CA: CPT

## 2022-01-02 NOTE — EDM.PDOC
ED HPI GENERAL MEDICAL PROBLEM





- General


Chief Complaint: Abdominal Pain


Stated Complaint: LT SIDE PAIN


Time Seen by Provider: 01/02/22 19:06


Source of Information: Reports: Patient, Family (Mother)


History Limitations: Reports: No Limitations





- History of Present Illness


INITIAL COMMENTS - FREE TEXT/NARRATIVE: 





Ms. Saucedo is a very pleasant 31-year-old woman who now presents the ED stating 

that she developed sharp left flank pain this morning.  She took some Excedrin 

Migraine, and it resolved, but then returned around 16:00 this afternoon, now 

radiating to the left lower quadrant of her abdomen.  She developed some nausea 

and vomited around 17:00.  No recent urinary symptoms.  No prior similar 

symptoms.





The patient states that she last ate around 15:30.





At triage, the patient's initial BP was found to be modestly elevated at 151/94,

with slight tachycardia of 101 bpm.  She was afebrile, saturating 98% on room 

air.  She appears to be relatively comfortable, in no acute distress.





Prior to this morning, the patient denies having a recent fever, chills, sore 

throat, ear pain, nasal or sinus congestion, cough, dyspnea, chest pain, 

palpitations, nausea, vomiting, constipation, diarrhea, abdominal pain, urinary 

symptoms, recent weight gain or weight loss, recent bloody bowel movements or 

black bowel movements, recent joint aches, headaches, or rashes.








The patient's PCP is Shirley Castellano NP, in [place].


Her Oncologist is Dr. Rickie Coles.


She has not received a COVID vaccination, nor an influenza vaccination this 

season.








  ** Left Abdomen


Pain Score (Numeric/FACES): 9





- Related Data


                                    Allergies











Allergy/AdvReac Type Severity Reaction Status Date / Time


 


No Known Allergies Allergy   Verified 01/02/22 19:05











Home Meds: 


                                    Home Meds





Albuterol Sulfate [Albuterol Sulfate Hfa] 2 puff INH BID 11/04/20 [History]


Fluticasone Propion/Salmeterol [Advair Hfa 45-21 Mcg Inhaler] 2 puff INH DAILY 

11/04/20 [History]


dexAMETHasone [Dexamethasone] 6 mg PO BID 5 Days #10 tab 11/09/20 [Rx]


norgestimate-ethinyl estradioL [[place] 28 Tablet] 1 tab PO ASDIRECTED 

11/09/20 [History]











Past Medical History


Endocrine/Metabolic History: Reports: Diabetes, Type II, Obesity/BMI 30+


Oncologic (Cancer) History: Reports: Hodgkin's Lymphoma (s/p CTx, RTx - cured)





- Infectious Disease History


Infectious Disease History: Reports: Novel Coronavirus (dx'd 11/4/2020)





- Past Surgical History


HEENT Surgical History: Reports: Oral Surgery (dental extractions)


Musculoskeletal Surgical History: Reports: Other (See Below) (Right ankle 

ligament repair x 2)


Oncologic Surgical History: Reports: Other (See Below) (Right neck LN bx. Right 

hip bone marrow bx.)





Social & Family History





- Tobacco Use


Tobacco Use Status *Q: Never Tobacco User





- Alcohol Use


Alcohol Use History: Yes


Alcohol Use Frequency: Socially





- Recreational Drug Use


Recreational Drug Use: No





- Living Situation & Occupation


Living situation: Reports: Single, Alone


Occupation: Employed (Teacher)





ED ROS GENERAL





- Review of Systems


Review Of Systems: Comprehensive ROS is negative, except as noted in HPI.





ED EXAM, GI/ABD





- Physical Exam


Exam: See Below


Exam Limited By: No Limitations


General Appearance: Alert, WD/WN, No Apparent Distress


Eyes: Bilateral: Normal Appearance, EOMI


Ears: Normal External Exam, Hearing Grossly Normal


Nose: Normal Inspection


Throat/Mouth: Normal Inspection, Normal Lips, Normal Voice, No Airway Compromise


Head: Atraumatic, Normocephalic


Neck: Normal Inspection, Full Range of Motion


Respiratory/Chest: No Respiratory Distress, Lungs Clear, Normal Breath Sounds, 

No Accessory Muscle Use


Cardiovascular: Normal Peripheral Pulses, Regular Rate, Rhythm, No Edema, No 

Gallop, No JVD, No Murmur, No Rub


GI/Abdominal Exam: Normal Bowel Sounds, Soft, No Organomegaly, No Distention, No

 Abnormal Bruit, No Mass, Tender (Very mild, left lower quadrant only.  No

ntender elsewhere.)


Back Exam: Normal Inspection, Full Range of Motion.  No: CVA Tenderness (L), CVA

 Tenderness (R)


Extremities: Normal Inspection, Normal Range of Motion, No Pedal Edema, Normal 

Capillary Refill


Neurological: Alert, Oriented, Normal Cognition, No Motor/Sensory Deficits


Psychiatric: Normal Affect


Skin Exam: Warm, Dry, Intact, Normal Color, No Rash





Course





- Vital Signs


Last Recorded V/S: 


                                Last Vital Signs











Temp  36.3 C   01/02/22 19:07


 


Pulse  101 H  01/02/22 19:07


 


Resp  17   01/02/22 19:07


 


BP  151/94 H  01/02/22 19:07


 


Pulse Ox  98   01/02/22 19:07














- Orders/Labs/Meds


Orders: 


                               Active Orders 24 hr











 Category Date Time Status


 


 Strain Urine [RC] ASDIRECTED Care  01/02/22 21:23 Active


 


 Abdomen Pelvis w Cont [CT] Stat Exams  01/02/22 19:25 Taken


 


 Sodium Chloride 0.9% [Normal Saline] 1,000 ml Med  01/02/22 19:30 Active





 IV ASDIRECTED   








                                Medication Orders





Sodium Chloride (Normal Saline)  1,000 mls @ 150 mls/hr IV ASDIRECTED TAMMY


   Last Admin: 01/02/22 20:06  Dose: 150 mls/hr


   Documented by: PAMELA








Labs: 


                                Laboratory Tests











  01/02/22 01/02/22 01/02/22 Range/Units





  19:50 19:50 20:03 


 


WBC  12.19 H    (3.98-10.04)  K/mm3


 


RBC  4.74    (3.98-5.22)  M/mm3


 


Hgb  14.9    (11.2-15.7)  gm/dl


 


Hct  43.2    (34.1-44.9)  %


 


MCV  91.1  D    (79.4-94.8)  fl


 


MCH  31.4    (25.6-32.2)  pg


 


MCHC  34.5    (32.2-35.5)  g/dl


 


RDW Std Deviation  41.3    (36.4-46.3)  fL


 


Plt Count  375 H D    (182-369)  K/mm3


 


MPV  10.0    (9.4-12.3)  fl


 


Neut % (Auto)  69.5    (34.0-71.1)  %


 


Lymph % (Auto)  20.6    (19.3-51.7)  %


 


Mono % (Auto)  8.0    (4.7-12.5)  %


 


Eos % (Auto)  1.0    (0.7-5.8)  


 


Baso % (Auto)  0.5    (0.1-1.2)  %


 


Neut # (Auto)  8.48 H    (1.56-6.13)  K/mm3


 


Lymph # (Auto)  2.51    (1.18-3.74)  K/mm3


 


Mono # (Auto)  0.97 H    (0.24-0.36)  K/mm3


 


Eos # (Auto)  0.12    (0.04-0.36)  K/mm3


 


Baso # (Auto)  0.06    (0.01-0.08)  K/mm3


 


Sodium   136   (136-145)  mEq/L


 


Potassium   4.5   (3.5-5.1)  mEq/L


 


Chloride   100   ()  mEq/L


 


Carbon Dioxide   23   (21-32)  mEq/L


 


Anion Gap   17.5 H   (5-15)  


 


BUN   11   (7-18)  mg/dL


 


Creatinine   0.9   (0.55-1.02)  mg/dL


 


Est Cr Clr Drug Dosing   88.07   mL/min


 


Estimated GFR (MDRD)   > 60   (>60)  mL/min


 


BUN/Creatinine Ratio   12.2 L   (14-18)  


 


Glucose   210 H   (70-99)  mg/dL


 


Calcium   9.3   (8.5-10.1)  mg/dL


 


Urine Color    Yellow  (Yellow)  


 


Urine Appearance    Slt cloudy H  (Clear)  


 


Urine pH    6.0  (5.0-8.0)  


 


Ur Specific Gravity    > or = 1.030  (1.005-1.030)  


 


Urine Protein    1+ H  (Negative)  


 


Urine Glucose (UA)    Negative  (Negative)  


 


Urine Ketones    2+ H  (Negative)  


 


Urine Occult Blood    2+ H  (Negative)  


 


Urine Nitrite    Negative  (Negative)  


 


Urine Bilirubin    Negative  (Negative)  


 


Urine Urobilinogen    0.2  (0.2-1.0)  


 


Ur Leukocyte Esterase    Negative  (Negative)  


 


Urine RBC    >100 H  (0-5)  /hpf


 


Urine WBC    0-5  (0-5)  /hpf


 


Ur Squamous Epith Cells    5-10 H  (0-5)  /hpf


 


Urine Bacteria    Moderate H  (FEW)  /hpf


 


Urine Mucus    Not seen  (FEW)  /hpf


 


Urine HCG, Qual     (NEGATIVE)  














  01/02/22 Range/Units





  20:03 


 


WBC   (3.98-10.04)  K/mm3


 


RBC   (3.98-5.22)  M/mm3


 


Hgb   (11.2-15.7)  gm/dl


 


Hct   (34.1-44.9)  %


 


MCV   (79.4-94.8)  fl


 


MCH   (25.6-32.2)  pg


 


MCHC   (32.2-35.5)  g/dl


 


RDW Std Deviation   (36.4-46.3)  fL


 


Plt Count   (182-369)  K/mm3


 


MPV   (9.4-12.3)  fl


 


Neut % (Auto)   (34.0-71.1)  %


 


Lymph % (Auto)   (19.3-51.7)  %


 


Mono % (Auto)   (4.7-12.5)  %


 


Eos % (Auto)   (0.7-5.8)  


 


Baso % (Auto)   (0.1-1.2)  %


 


Neut # (Auto)   (1.56-6.13)  K/mm3


 


Lymph # (Auto)   (1.18-3.74)  K/mm3


 


Mono # (Auto)   (0.24-0.36)  K/mm3


 


Eos # (Auto)   (0.04-0.36)  K/mm3


 


Baso # (Auto)   (0.01-0.08)  K/mm3


 


Sodium   (136-145)  mEq/L


 


Potassium   (3.5-5.1)  mEq/L


 


Chloride   ()  mEq/L


 


Carbon Dioxide   (21-32)  mEq/L


 


Anion Gap   (5-15)  


 


BUN   (7-18)  mg/dL


 


Creatinine   (0.55-1.02)  mg/dL


 


Est Cr Clr Drug Dosing   mL/min


 


Estimated GFR (MDRD)   (>60)  mL/min


 


BUN/Creatinine Ratio   (14-18)  


 


Glucose   (70-99)  mg/dL


 


Calcium   (8.5-10.1)  mg/dL


 


Urine Color   (Yellow)  


 


Urine Appearance   (Clear)  


 


Urine pH   (5.0-8.0)  


 


Ur Specific Gravity   (1.005-1.030)  


 


Urine Protein   (Negative)  


 


Urine Glucose (UA)   (Negative)  


 


Urine Ketones   (Negative)  


 


Urine Occult Blood   (Negative)  


 


Urine Nitrite   (Negative)  


 


Urine Bilirubin   (Negative)  


 


Urine Urobilinogen   (0.2-1.0)  


 


Ur Leukocyte Esterase   (Negative)  


 


Urine RBC   (0-5)  /hpf


 


Urine WBC   (0-5)  /hpf


 


Ur Squamous Epith Cells   (0-5)  /hpf


 


Urine Bacteria   (FEW)  /hpf


 


Urine Mucus   (FEW)  /hpf


 


Urine HCG, Qual  Negative  (NEGATIVE)  











Meds: 


Medications











Generic Name Dose Route Start Last Admin





  Trade Name Freq  PRN Reason Stop Dose Admin


 


Sodium Chloride  1,000 mls @ 150 mls/hr  01/02/22 19:30  01/02/22 20:06





  Normal Saline  IV   150 mls/hr





  ASDIRECTED TAMMY   Administration














Discontinued Medications














Generic Name Dose Route Start Last Admin





  Trade Name Freq  PRN Reason Stop Dose Admin


 


Hydromorphone HCl  0.5 mg  01/02/22 19:24  01/02/22 19:48





  Hydromorphone 1 Mg/Ml Syringe  IVPUSH  01/02/22 19:25  0.5 mg





  ONETIME STA   Administration


 


Hydromorphone HCl  0.5 mg  01/02/22 20:26  01/02/22 20:30





  Hydromorphone 0.5 Mg/0.5 Ml Syringe  IVPUSH  01/02/22 20:27  0.5 mg





  ONETIME ONE   Administration


 


Hydromorphone HCl  1 mg  01/02/22 21:22  01/02/22 21:32





  Hydromorphone 1 Mg/Ml Syringe  IVPUSH  01/02/22 21:23  1 mg





  ONETIME ONE   Administration


 


Iopamidol  100 ml  01/02/22 19:35  01/02/22 20:48





  Iopamidol 612 Mg/Ml 100 Ml Bottle  IVPUSH  01/02/22 19:36  100 ml





  ONETIME ONE   Administration


 


Ketorolac Tromethamine  30 mg  01/02/22 21:22  01/02/22 21:36





  Ketorolac 30 Mg/Ml Sdv  IVPUSH  01/02/22 21:23  30 mg





  ONETIME STA   Administration


 


Ondansetron HCl  4 mg  01/02/22 19:24  01/02/22 19:50





  Ondansetron 4 Mg/2 Ml Sdv  IVPUSH  01/02/22 19:25  4 mg





  ONETIME ONE   Administration


 


Sodium Chloride  10 ml  01/02/22 19:35  01/02/22 20:48





  Sodium Chloride 0.9% 10 Ml Sdv  FLUSH  01/02/22 19:36  10 ml





  ONETIME ONE   Administration


 


Tamsulosin HCl  0.4 mg  01/02/22 21:22  01/02/22 21:36





  Tamsulosin 0.4 Mg Cap.Er  PO  01/02/22 21:23  0.4 mg





  ONETIME ONE   Administration














- Re-Assessments/Exams


Free Text/Narrative Re-Assessment/Exam: 





01/02/22 19:29


The cause of the patient's pain is unclear.  Her history is compelling for a 

left-sided ureterolith, but she is somewhat tender to the left lower quadrant, 

with no left CVA tenderness.  I have therefore ordered a work-up that includes a

several blood tests, a urinalysis and urine pregnancy test, and a CT of the 

abdomen and pelvis with oral and IV contrast.  In the meantime, the patient will

be treated with IV Dilaudid, IV Zofran, and IV fluid.








01/02/22 20:54


The patient's CBC is remarkable for slight leukocytosis of 12.19, and 

thrombocytosis of 375,000, with remainder of her CBC being unremarkable.


Her BMP is remarkable for hyperglycemia of 210, and is otherwise unremarkable.


Her urinalysis is remarkable for slightly cloudy appearance, 2+ occult blood 

with >100 RBCs, leukocyte esterase negative with 0-5 WBCs, nitrate negative with

moderate bacteria, and 5-10 squamous epithelial cells.


Her urine pregnancy test is negative.








01/02/22 21:23


CT of the abdomen and pelvis with oral and IV contrast is read by [facility] as:


1.  2.4 mm stone at the left proximal ureter with mild to moderate left 

hydronephrosis and proximal hydroureter to the level of the stone and mild left 

perinephric/periureteral inflammation.


2.  Bilateral nonobstructing renal stones.


3.  Incidental/nonacute findings are listed in the report.





Based on the above, I have ordered some additional Dilaudid, tamsulosin, and IV 

tramadol.  The patient will be given a urine strainer.








01/02/22 21:31


Test results discussed with the patient and her mother.  As above, the patient 

has a 2.4 mm proximal left ureteral stone.  Based on the size of the stone, she 

will likely pass it on her own, but I explained that it could take a couple of 

weeks.  She is to take OTC ibuprofen regularly.  I will discharge her home with 

[facility] prescriptions for Percocet, tamsulosin, and Zofran.  She is to stay 

adequately hydrated and strain all of her urine.  I will refer her to Dr. Wright in the event that her symptoms do not resolve within a couple of 

weeks.














Departure





- Departure


Time of Disposition: 21:33


Disposition: Home, Self-Care 01


Condition: Good


Clinical Impression: 


 Ureterolithiasis








- Discharge Information


*PRESCRIPTION DRUG MONITORING PROGRAM REVIEWED*: Not Applicable


*COPY OF PRESCRIPTION DRUG MONITORING REPORT IN PATIENT SAVI: Not Applicable


Instructions:  Abdominal Pain, Adult, Easy-to-Read


Referrals: 


Cholo Wright MD [Ordering Only Provider] - 


Wos,Edward J, MD [Ordering Only Provider] - 


Shirley Castellano NP [Ordering Only Provider] - 


Forms:  ED Department Discharge


Additional Instructions: 


You were seen in the emergency room after developing left flank pain this 

morning that began radiating to your lower left abdomen this afternoon, 

associated with nausea and vomiting.





Work-up in the ER included some blood tests, a urinalysis, a urine pregnancy 

test, and a CT of your abdomen and pelvis with oral and IV contrast.





Your work-up revealed a 2.4 mm stone in your upper left ureter.





Based on the size of the stone, you will most likely pass it on your own, 

although it may take up to a couple of weeks.





We recommend you take over-the-counter ibuprofen, 3 tablets (600 mg) up to every

8 hours, with food, around-the-clock initially, then as needed for discomfort.





[facility] prescriptions for the prescription opioid Percocet, the anti-spasm 

medicine tamsulosin (Flomax) and the anti-nausea medicine Zofran ODT have been 

provided.





You may take 1 to 2 tablets of Percocet every 6-8 hours, as needed for pain not 

relieved by ibuprofen.  If you take Percocet, do not drive or operate heavy 

machinery for 12 hours afterwards.  Percocet may cause constipation, so consider

taking a stool softener.





Take 1 capsule of tamsulosin every evening, starting tomorrow evening, Monday, 

1/3/2022, as needed for discomfort.





You may dissolve 1 tablet of Zofran ODT on your tongue up to every 8 hours, as 

needed for nausea/vomiting.





Stay adequately hydrated.  It does not really matter what type of fluid you 

drink.





Strain all of your urine.  If you capture the stone, take it to your PCP for 

analysis.





If you have not had any significant relief in your symptoms after 1 week, please

follow-up with the Urologist Dr. Cholo Wright, for further evaluation.





If any other problems, please do not hesitate to return to the ER.





Sepsis Event Note (ED)





- Focused Exam


Vital Signs: 


                                   Vital Signs











  Temp Pulse Resp BP Pulse Ox


 


 01/02/22 19:07  36.3 C  101 H  17  151/94 H  98














- My Orders


Last 24 Hours: 


My Active Orders





01/02/22 19:25


Abdomen Pelvis w Cont [CT] Stat 





01/02/22 19:30


Sodium Chloride 0.9% [Normal Saline] 1,000 ml IV ASDIRECTED 





01/02/22 21:23


Strain Urine [RC] ASDIRECTED 














- Assessment/Plan


Last 24 Hours: 


My Active Orders





01/02/22 19:25


Abdomen Pelvis w Cont [CT] Stat 





01/02/22 19:30


Sodium Chloride 0.9% [Normal Saline] 1,000 ml IV ASDIRECTED 





01/02/22 21:23


Strain Urine [RC] ASDIRECTED

## 2022-01-03 ENCOUNTER — HOSPITAL ENCOUNTER (EMERGENCY)
Dept: HOSPITAL 41 - JD.ED | Age: 32
Discharge: HOME | End: 2022-01-03
Payer: COMMERCIAL

## 2022-01-03 DIAGNOSIS — E66.9: ICD-10-CM

## 2022-01-03 DIAGNOSIS — E11.9: ICD-10-CM

## 2022-01-03 DIAGNOSIS — Z79.899: ICD-10-CM

## 2022-01-03 DIAGNOSIS — N20.1: Primary | ICD-10-CM

## 2022-01-03 PROCEDURE — 96374 THER/PROPH/DIAG INJ IV PUSH: CPT

## 2022-01-03 PROCEDURE — 36415 COLL VENOUS BLD VENIPUNCTURE: CPT

## 2022-01-03 PROCEDURE — 85025 COMPLETE CBC W/AUTO DIFF WBC: CPT

## 2022-01-03 PROCEDURE — 96376 TX/PRO/DX INJ SAME DRUG ADON: CPT

## 2022-01-03 PROCEDURE — 99284 EMERGENCY DEPT VISIT MOD MDM: CPT

## 2022-01-03 PROCEDURE — 81001 URINALYSIS AUTO W/SCOPE: CPT

## 2022-01-03 PROCEDURE — 80053 COMPREHEN METABOLIC PANEL: CPT

## 2022-01-03 PROCEDURE — 96375 TX/PRO/DX INJ NEW DRUG ADDON: CPT

## 2022-01-03 NOTE — EDM.PDOC
ED HPI GENERAL MEDICAL PROBLEM





- General


Chief Complaint: Genitourinary Problem


Stated Complaint: KIDNEY STONE


Time Seen by Provider: 01/03/22 18:06


Source of Information: Reports: Patient, Old Records, RN Notes Reviewed


History Limitations: Reports: No Limitations





- History of Present Illness


INITIAL COMMENTS - FREE TEXT/NARRATIVE: 


Patient is a 31-year-old female returning to the emergency department with 

complaints of left flank pain with nausea and vomiting.  She was seen in the 

emergency department last evening and diagnosed with left-sided kidney stone.  

She was prescribed tamsulosin, Percocet, and Zofran.  Last dose of Zofran was 

around 11 AM this morning.  She is unsure when she took her last Percocet, 

however she took 3 ibuprofen around 1700, however vomited immediately 

thereafter.  She denies any fever or chills.  Patient is rating pain 10 out of 

10 to the left flank.





  ** Left Flank


Pain Score (Numeric/FACES): 10





- Related Data


                                    Allergies











Allergy/AdvReac Type Severity Reaction Status Date / Time


 


No Known Allergies Allergy   Verified 01/03/22 17:58











Home Meds: 


                                    Home Meds





Albuterol Sulfate [Albuterol Sulfate Hfa] 2 puff INH BID 11/04/20 [History]


Fluticasone Propion/Salmeterol [Advair Hfa 45-21 Mcg Inhaler] 2 puff INH DAILY 

11/04/20 [History]


norgestimate-ethinyl estradioL [[place] 28 Tablet] 1 tab PO ASDIRECTED 

11/09/20 [History]


Apple Pectin 1,400 mg PO DAILY 01/03/22 [History]


Cyanocobalamin (Vitamin B12) [Vitamin B12] 500 mcg PO DAILY 01/03/22 [History]


Ethinyl Estradiol/Drospirenone [[place] 3 mg-0.02 mg Tablet] 1 tab PO DAILY 

01/03/22 [History]


Isotonix-Vitamin D 1 tab PO DAILY 01/03/22 [History]


Magnesium Oxide [Magnesium] 400 mg PO DAILY 01/03/22 [History]


Omega-3 Fatty Acids/Fish Oil [Fish Oil 1,000 mg Capsule] 2,000 mg PO DAILY 

01/03/22 [History]


Selenium 200 mcg PO DAILY 01/03/22 [History]


Spironolactone [Aldactone] 100 mg PO DAILY 01/03/22 [History]


Ultra-Rebeca Women's Probiotic 1 tab PO DAILY 01/03/22 [History]


Zinc 50 mg PO DAILY 01/03/22 [History]


sitaGLIPtin Phos/Metformin HCl [Janumet Xr 100-1,000 mg Tablet] 100 - 1,000 mg 

PO DAILY 01/03/22 [History]











Past Medical History


HEENT History: Reports: Impaired Vision


Other HEENT History: wears eyeglasses.


Genitourinary History: Reports: Renal Calculus, UTI, Recurrent


Musculoskeletal History: Reports: Fracture


Neurological History: Reports: Migraines


Endocrine/Metabolic History: Reports: Diabetes, Type II, Obesity/BMI 30+


Hematologic History: Reports: Blood Transfusion(s)


Immunologic History: Reports: Immunosuppression


Oncologic (Cancer) History: Reports: Hodgkin's Lymphoma


Other Oncologic History: 2007





- Infectious Disease History


Infectious Disease History: Reports: Chicken Pox, Novel Coronavirus





- Past Surgical History


HEENT Surgical History: Reports: Oral Surgery


Musculoskeletal Surgical History: Reports: Other (See Below)


Other Musculoskeletal Surgeries/Procedures:: ankle surgeries.


Oncologic Surgical History: Reports: Other (See Below)


Other Oncologic Surgeries/Procedures: biopsy lymph node on neck, bone marrow 

biopsies.





Social & Family History





- Tobacco Use


Tobacco Use Status *Q: Never Tobacco User


Second Hand Smoke Exposure: No





- Caffeine Use


Caffeine Use: Reports: Soda





- Recreational Drug Use


Recreational Drug Use: Yes





- Living Situation & Occupation


Living situation: Reports: Single, Alone


Occupation: Employed (Teacher)





ED ROS GENERAL





- Review of Systems


Review Of Systems: Comprehensive ROS is negative, except as noted in HPI.





ED EXAM, RENAL/





- Physical Exam


Exam: See Below


Exam Limited By: No Limitations


General Appearance: Alert, Moderate Distress


Respiratory/Chest: No Respiratory Distress, Lungs Clear, Normal Breath Sounds, 

No Accessory Muscle Use, Chest Non-Tender


Cardiovascular: Normal Peripheral Pulses, Regular Rate, Rhythm, No Edema, No 

Gallop, No JVD, No Murmur, No Rub


GI/Abdominal: Normal Bowel Sounds, Soft, Non-Tender, No Organomegaly, No 

Distention, No Abnormal Bruit, No Mass


Back Exam: Normal Inspection, CVA Tenderness (L)


Neurological: Alert, Oriented, CN II-XII Intact, Normal Cognition, Normal Gait, 

Normal Reflexes, No Motor/Sensory Deficits


Psychiatric: Normal Affect, Normal Mood


Skin Exam: Warm, Dry, Intact, Normal Color, No Rash





Course





- Vital Signs


Last Recorded V/S: 


                                Last Vital Signs











Temp  96.8 F L  01/03/22 17:55


 


Pulse  88   01/03/22 17:55


 


Resp  16   01/03/22 17:55


 


BP  153/89 H  01/03/22 17:55


 


Pulse Ox  100   01/03/22 17:55














- Orders/Labs/Meds


Orders: 


                               Active Orders 24 hr











 Category Date Time Status


 


 Peripheral IV Care [RC] .AS DIRECTED Care  01/03/22 18:11 Active


 


 Sodium Chloride 0.9% [Saline Flush] Med  01/03/22 18:11 Active





 10 ml FLUSH ASDIRECTED PRN   


 


 Peripheral IV Insertion Adult [OM.PC] Stat Oth  01/03/22 18:11 Ordered








                                Medication Orders





Sodium Chloride (Sodium Chloride 0.9% 10 Ml Syringe)  10 ml FLUSH ASDIRECTED PRN


   PRN Reason: Keep Vein Open


   Last Admin: 01/03/22 18:38  Dose: 10 ml


   Documented by: JUANJOSE








Labs: 


                                Laboratory Tests











  01/03/22 01/03/22 01/03/22 Range/Units





  18:30 18:30 20:10 


 


WBC  15.72 H    (3.98-10.04)  K/mm3


 


RBC  4.51    (3.98-5.22)  M/mm3


 


Hgb  14.1    (11.2-15.7)  gm/dl


 


Hct  41.5    (34.1-44.9)  %


 


MCV  92.0    (79.4-94.8)  fl


 


MCH  31.3    (25.6-32.2)  pg


 


MCHC  34.0    (32.2-35.5)  g/dl


 


RDW Std Deviation  42.2    (36.4-46.3)  fL


 


Plt Count  374 H    (182-369)  K/mm3


 


MPV  10.3    (9.4-12.3)  fl


 


Neut % (Auto)  71.3 H    (34.0-71.1)  %


 


Lymph % (Auto)  15.6 L    (19.3-51.7)  %


 


Mono % (Auto)  11.9    (4.7-12.5)  %


 


Eos % (Auto)  0.7    (0.7-5.8)  


 


Baso % (Auto)  0.2    (0.1-1.2)  %


 


Neut # (Auto)  11.20 H    (1.56-6.13)  K/mm3


 


Lymph # (Auto)  2.46    (1.18-3.74)  K/mm3


 


Mono # (Auto)  1.87 H    (0.24-0.36)  K/mm3


 


Eos # (Auto)  0.11    (0.04-0.36)  K/mm3


 


Baso # (Auto)  0.03    (0.01-0.08)  K/mm3


 


Manual Slide Review  Abnormal smear    


 


Sodium   135 L   (136-145)  mEq/L


 


Potassium   3.9   (3.5-5.1)  mEq/L


 


Chloride   98   ()  mEq/L


 


Carbon Dioxide   22   (21-32)  mEq/L


 


Anion Gap   18.9 H   (5-15)  


 


BUN   12   (7-18)  mg/dL


 


Creatinine   1.1 H   (0.55-1.02)  mg/dL


 


Est Cr Clr Drug Dosing   72.06   mL/min


 


Estimated GFR (MDRD)   58   (>60)  mL/min


 


BUN/Creatinine Ratio   10.9 L   (14-18)  


 


Glucose   223 H   (70-99)  mg/dL


 


Calcium   8.9   (8.5-10.1)  mg/dL


 


Total Bilirubin   0.5   (0.2-1.0)  mg/dL


 


AST   38 H   (15-37)  U/L


 


ALT   47   (14-59)  U/L


 


Alkaline Phosphatase   57   ()  U/L


 


Total Protein   7.7   (6.4-8.2)  g/dl


 


Albumin   3.6   (3.4-5.0)  g/dl


 


Globulin   4.1   gm/dL


 


Albumin/Globulin Ratio   0.9 L   (1-2)  


 


Urine Color    Yellow  (Yellow)  


 


Urine Appearance    Clear  (Clear)  


 


Urine pH    6.5  (5.0-8.0)  


 


Ur Specific Gravity    > or = 1.030  (1.005-1.030)  


 


Urine Protein    1+ H  (Negative)  


 


Urine Glucose (UA)    Negative  (Negative)  


 


Urine Ketones    3+ H  (Negative)  


 


Urine Occult Blood    2+ H  (Negative)  


 


Urine Nitrite    Negative  (Negative)  


 


Urine Bilirubin    Negative  (Negative)  


 


Urine Urobilinogen    0.2  (0.2-1.0)  


 


Ur Leukocyte Esterase    Negative  (Negative)  


 


Urine RBC    20-30 H  (0-5)  /hpf


 


Urine WBC    5-10 H  (0-5)  /hpf


 


Ur Epithelial Cells    5-10 H  (0-5)  /hpf


 


Urine Bacteria    Few  (FEW)  /hpf


 


Urine Mucus    Not seen  (FEW)  /hpf











Meds: 


Medications











Generic Name Dose Route Start Last Admin





  Trade Name Lydia  PRN Reason Stop Dose Admin


 


Sodium Chloride  10 ml  01/03/22 18:11  01/03/22 18:38





  Sodium Chloride 0.9% 10 Ml Syringe  FLUSH   10 ml





  ASDIRECTED PRN   Administration





  Keep Vein Open  














Discontinued Medications














Generic Name Dose Route Start Last Admin





  Trade Name Lydia  PRN Reason Stop Dose Admin


 


Hydromorphone HCl  0.5 mg  01/03/22 18:11  01/03/22 18:38





  Hydromorphone 0.5 Mg/0.5 Ml Syringe  IVPUSH  01/03/22 18:12  0.5 mg





  ONETIME ONE   Administration


 


Hydromorphone HCl  0.5 mg  01/03/22 19:52  01/03/22 19:56





  Hydromorphone 0.5 Mg/0.5 Ml Syringe  IVPUSH  01/03/22 19:53  0.5 mg





  ONETIME ONE   Administration


 


Hydromorphone HCl  0.5 mg  01/03/22 21:28  01/03/22 21:31





  Hydromorphone 0.5 Mg/0.5 Ml Syringe  IVPUSH  01/03/22 21:29  0.5 mg





  ONETIME ONE   Administration


 


Sodium Chloride  1,000 mls @ 999 mls/hr  01/03/22 18:11  01/03/22 18:38





  Normal Saline  IV  01/03/22 19:11  999 mls/hr





  NOW STA   Administration


 


Ketorolac Tromethamine  15 mg  01/03/22 18:11  01/03/22 18:38





  Ketorolac 30 Mg/Ml Sdv  IVPUSH  01/03/22 18:12  15 mg





  ONETIME ONE   Administration


 


Ondansetron HCl  4 mg  01/03/22 18:11  01/03/22 18:38





  Ondansetron 4 Mg/2 Ml Sdv  IVPUSH  01/03/22 18:12  4 mg





  ONETIME ONE   Administration


 


Oxycodone/Acetaminophen  1 tab  01/03/22 21:06  01/03/22 21:31





  Acetaminophen/Oxycodone 325-5 Mg Tab  PO  01/03/22 21:07  1 tab





  ONETIME ONE   Administration














- Re-Assessments/Exams


Free Text/Narrative Re-Assessment/Exam: 


Patient is a 31-year-old female return to ER with complaints of left flank pain 

with a known kidney stone.  She has been vomiting since 3 PM and has been able 

to keep medications down.  On exam, she does have left-sided CVA tenderness.  

She is in moderate distress.  I have ordered IV fluids, Zofran, Toradol, and 

Dilaudid.  I will complete blood work and urinalysis








01/03/22 21:58


Patient is feeling much better.  Urinalysis positive for blood, bacteria, and 

squamous epithelials with no leukocyte esterase or nitrate.  WBCs are likely 

contamination.  I will have her increase her Zofran to every 6 hours and omar

nue 1-2 tabs of Percocet every 6 in addition to ibuprofen.  Discussed return 

precautions as well as urology follow-up as needed.  Discharge instructions as 

documented.





Departure





- Departure


Time of Disposition: 21:56


Disposition: Home, Self-Care 01


Condition: Good


Clinical Impression: 


 Ureterolithiasis








- Discharge Information


*PRESCRIPTION DRUG MONITORING PROGRAM REVIEWED*: No


*COPY OF PRESCRIPTION DRUG MONITORING REPORT IN PATIENT SAVI: No


Instructions:  Kidney Stones, Easy-to-Read


Referrals: 


PCP,Not In Area [Primary Care Provider] - 


Forms:  ED Department Discharge


Additional Instructions: 


Take Zofran ODT 4 mg every 6 hours for nausea.





Take 1 to 2 tablets of Percocet every 6 hours as needed for pain.  Do not work 

or drive for 12 hours after taking this medication as it can be sedating.  You 

did receive 1 tablet of Percocet while in the ER.





Use ibuprofen 600 mg every 6 hours.  You may take your next dose at midnight 

this evening as you received Toradol in the ER.





Continue to strain your urine.





If you fail to pass the stone within the next week, recommend follow-up with 

urology.





Return to ER for any new or worsening symptoms.





Sepsis Event Note (ED)





- Evaluation


Sepsis Screening Result: No Definite Risk





- Focused Exam


Vital Signs: 


                                   Vital Signs











  Temp Pulse Resp BP Pulse Ox


 


 01/03/22 17:55  96.8 F L  88  16  153/89 H  100














- My Orders


Last 24 Hours: 


My Active Orders





01/03/22 18:11


Peripheral IV Care [RC] .AS DIRECTED 


Sodium Chloride 0.9% [Saline Flush]   10 ml FLUSH ASDIRECTED PRN 


Peripheral IV Insertion Adult [OM.PC] Stat 














- Assessment/Plan


Last 24 Hours: 


My Active Orders





01/03/22 18:11


Peripheral IV Care [RC] .AS DIRECTED 


Sodium Chloride 0.9% [Saline Flush]   10 ml FLUSH ASDIRECTED PRN 


Peripheral IV Insertion Adult [OM.PC] Stat

## 2022-01-03 NOTE — CT
EXAM: CT ABDOMEN \T\ PELVIS W/ CONTRAST

LOCATION: [facility]

DATE/TIME: 1/2/2022 8:37 PM

INDICATION: Abdominal pain; other: left lower quadrant and left flank pain; left
lower quadrant abdomen pain

and left flank pain. nausea, tenderness

COMPARISON: None.

TECHNIQUE: CT scan of the abdomen and pelvis was performed following injection 
of IV contrast. Multiplanar

reformats were obtained. Dose reduction techniques were used.

CONTRAST: Isovue 300 100 ml

FINDINGS:

LOWER CHEST: Normal.

HEPATOBILIARY: Hepatic steatosis. Benign calcified right posterior hepatic 
granuloma.

PANCREAS: Normal.

SPLEEN: Normal.

ADRENAL GLANDS: Normal.

KIDNEYS/BLADDER: There is an obstructing 2 mm left mid ureteral stone with mild 
hydroureteronephrosis.

There is a slight delayed nephrogram of the left kidney as well as mild fat 
stranding around the left kidney and

proximal ureter. Bilateral nonobstructing renal calculi. No right-sided 
hydronephrosis.

BOWEL: Normal.

LYMPH NODES: Normal.

VASCULATURE: Unremarkable.

PELVIC ORGANS: Normal.

MUSCULOSKELETAL: Normal.

IMPRESSION:

1. Obstructing 2 mm left mid ureteral stone.

2. Bilateral nonobstructing renal calculi.

SIGNED BY: Ken Brantley MD 1/3/2022 12:46 PM

SHERRI